# Patient Record
Sex: MALE | Race: BLACK OR AFRICAN AMERICAN | NOT HISPANIC OR LATINO | Employment: FULL TIME | URBAN - METROPOLITAN AREA
[De-identification: names, ages, dates, MRNs, and addresses within clinical notes are randomized per-mention and may not be internally consistent; named-entity substitution may affect disease eponyms.]

---

## 2018-04-06 ENCOUNTER — HOSPITAL ENCOUNTER (EMERGENCY)
Facility: HOSPITAL | Age: 53
Discharge: HOME | End: 2018-04-06
Payer: COMMERCIAL

## 2018-04-06 VITALS
BODY MASS INDEX: 30.37 KG/M2 | HEART RATE: 81 BPM | WEIGHT: 182.31 LBS | SYSTOLIC BLOOD PRESSURE: 156 MMHG | TEMPERATURE: 98 F | DIASTOLIC BLOOD PRESSURE: 87 MMHG | OXYGEN SATURATION: 98 % | RESPIRATION RATE: 18 BRPM | HEIGHT: 65 IN

## 2018-04-06 DIAGNOSIS — L02.419 ABSCESS OF FOREARM: Primary | ICD-10-CM

## 2018-04-06 DIAGNOSIS — M79.602 PAIN OF LEFT UPPER EXTREMITY: ICD-10-CM

## 2018-04-06 DIAGNOSIS — R03.0 ELEVATED BLOOD PRESSURE READING WITHOUT DIAGNOSIS OF HYPERTENSION: ICD-10-CM

## 2018-04-06 PROCEDURE — 10060 I&D ABSCESS SIMPLE/SINGLE: CPT | Mod: LT

## 2018-04-06 PROCEDURE — 25000003 PHARM REV CODE 250: Performed by: PHYSICIAN ASSISTANT

## 2018-04-06 PROCEDURE — 99283 EMERGENCY DEPT VISIT LOW MDM: CPT | Mod: 25

## 2018-04-06 RX ORDER — NAPROXEN 500 MG/1
500 TABLET ORAL 2 TIMES DAILY WITH MEALS
Qty: 12 TABLET | Refills: 0 | Status: SHIPPED | OUTPATIENT
Start: 2018-04-06

## 2018-04-06 RX ORDER — CLINDAMYCIN HYDROCHLORIDE 150 MG/1
300 CAPSULE ORAL
Status: COMPLETED | OUTPATIENT
Start: 2018-04-06 | End: 2018-04-06

## 2018-04-06 RX ORDER — NAPROXEN 500 MG/1
500 TABLET ORAL
Status: COMPLETED | OUTPATIENT
Start: 2018-04-06 | End: 2018-04-06

## 2018-04-06 RX ORDER — CLINDAMYCIN HYDROCHLORIDE 150 MG/1
300 CAPSULE ORAL EVERY 8 HOURS
Qty: 42 CAPSULE | Refills: 0 | Status: SHIPPED | OUTPATIENT
Start: 2018-04-06 | End: 2018-04-13

## 2018-04-06 RX ADMIN — LIDOCAINE HYDROCHLORIDE 10 ML: 10; .005 INJECTION, SOLUTION EPIDURAL; INFILTRATION; INTRACAUDAL; PERINEURAL at 09:04

## 2018-04-06 RX ADMIN — CLINDAMYCIN HYDROCHLORIDE 300 MG: 150 CAPSULE ORAL at 09:04

## 2018-04-06 RX ADMIN — NAPROXEN 500 MG: 500 TABLET ORAL at 09:04

## 2018-04-07 NOTE — ED PROVIDER NOTES
History      Chief Complaint   Patient presents with    Abscess     to L forearm; seen at clinic Monday, given antibiotic shot and keflex prescription; pt states redness, pain, and swelling has increased       Review of patient's allergies indicates:  No Known Allergies     HPI   HPI    4/6/2018, 9:19 PM   History obtained from the patient      History of Present Illness: Najma Garcia Jr. is a 52 y.o. male patient who presents to the Emergency Department for painful abscess to left forearm for 1 week.  Taking keflex with no improvement.  Denies f/n/v.  Symptoms are constant and moderate in severity.  No mitigating or exacerbating factors reported.   No further complaints or concerns at this time.           PCP: Primary Doctor No       Past Medical History:  History reviewed. No pertinent past medical history.      Past Surgical History:  History reviewed. No pertinent surgical history.        Family History:  History reviewed. No pertinent family history.        Social History:  Social History     Social History Main Topics    Smoking status: Never Smoker    Smokeless tobacco: Not on file    Alcohol use Not on file    Drug use: Unknown    Sexual activity: Not on file       ROS   Review of Systems   Constitutional: Negative for activity change and appetite change.   HENT: Negative for sore throat.    Respiratory: Negative for shortness of breath.    Cardiovascular: Negative for chest pain.   Gastrointestinal: Negative for nausea and vomiting.   Genitourinary: Negative for dysuria.   Musculoskeletal: Negative for back pain and neck pain.   Skin: Positive for abscess.  Negative for rash.   Neurological: Negative for weakness.   Hematological: Does not bruise/bleed easily.   All other systems reviewed and are negative.      Review of Systems    Physical Exam      Initial Vitals [04/06/18 2100]   BP Pulse Resp Temp SpO2   (!) 156/87 81 18 98.4 °F (36.9 °C) 98 %      MAP       110         Physical Exam  Vital  signs and nursing notes reviewed.  Constitutional: Patient is in NAD. Awake and alert. Well-developed and well-nourished.  Head: Atraumatic. Normocephalic.  Eyes: PERRL. EOM intact. Conjunctivae nl. No scleral icterus.  ENT: Mucous membranes are moist. Oropharynx is clear.  Neck: Supple. No JVD. No lymphadenopathy.  No meningismus  Cardiovascular: Regular rate and rhythm. No murmurs, rubs, or gallops. Distal pulses are 2+ and symmetric.  Pulmonary/Chest: No respiratory distress. Clear to auscultation bilaterally. No wheezing, rales, or rhonchi.  Abdominal: Soft. Non-distended. No TTP. No rebound, guarding, or rigidity. Good bowel sounds.  Genitourinary: No CVA tenderness  Musculoskeletal: Moves all extremities. No edema.   Skin: Warm and dry.   3 cm of erythema with central fluctuance to left forearm  Neurological: Awake and alert. No acute focal neurological deficits are appreciated.  Psychiatric: Normal affect. Good eye contact. Appropriate in content.      ED Course      I & D - Incision and Drainage  Date/Time: 4/6/2018 10:58 PM  Performed by: NEYDA HERNÁNDEZ.  Authorized by: NEYDA HERNÁNDEZ.   Consent Done: Yes  Consent: Verbal consent obtained.  Risks and benefits: risks, benefits and alternatives were discussed  Consent given by: patient  Patient understanding: patient states understanding of the procedure being performed  Patient consent: the patient's understanding of the procedure matches consent given  Procedure consent: procedure consent matches procedure scheduled  Type: abscess  Body area: upper extremity  Location details: left arm    Anesthesia:  Local Anesthetic: lidocaine 1% with epinephrine  Scalpel size: 11  Incision type: single straight  Complexity: simple  Drainage: pus  Drainage amount: moderate  Wound treatment: incision,  expression of material,  drainage,  wound left open,  deloculation and  wound packed  Packing material: 1/4 in gauze  Complications: No  Patient tolerance: Patient tolerated  "the procedure well with no immediate complications        ED Vital Signs:  Vitals:    04/06/18 2100   BP: (!) 156/87   Pulse: 81   Resp: 18   Temp: 98.4 °F (36.9 °C)   TempSrc: Oral   SpO2: 98%   Weight: 82.7 kg (182 lb 5.1 oz)   Height: 5' 5" (1.651 m)                 Imaging Results:  Imaging Results    None          Pre-hypertension/Hypertension: The pt has been informed that they may have pre-hypertension or hypertension based on a blood pressure reading in the ED. I recommend that the pt call the PCP listed on their discharge instructions or a physician of their choice this week to arrange f/u for further evaluation of possible pre-hypertension or hypertension.       All findings were reviewed with the patient/family in detail.   All remaining questions and concerns were addressed at that time.  Patient/family has been counseled regarding the need for follow-up as well as the indication to return to the emergency room should new or worrisome developments occur.      ED Discussion               Medication(s) given in the ER:  Medications   lidocaine-EPINEPHrine (PF) 1%-1:200,000 injection 10 mL (10 mLs Intradermal Given 4/6/18 2145)   clindamycin capsule 300 mg (300 mg Oral Given 4/6/18 2145)   naproxen tablet 500 mg (500 mg Oral Given 4/6/18 2145)           Follow-up Information     [facility] in 2 days.    Specialty:  Internal Medicine  Contact information:  [address] [facility] [address] [phone]  Additional information:  (off [facility]) 1st floor           [facility]    Specialty:  Emergency Medicine  Why:  If symptoms worsen  Contact information:  [address] [facility] [address] [phone]                     New Prescriptions    CLINDAMYCIN (CLEOCIN) 150 MG CAPSULE    Take 2 capsules (300 mg total) by mouth every 8 (eight) hours.    NAPROXEN (NAPROSYN) 500 MG TABLET    Take 1 tablet (500 mg total) by mouth 2 (two) " times daily with meals. Prn pain          Medical Decision Making        MDM               Clinical Impression:        ICD-10-CM ICD-9-CM   1. Abscess of forearm L02.419 682.3   2. Elevated blood pressure reading without diagnosis of hypertension R03.0 796.2   3. Pain of left upper extremity M79.602 729.5              Disposition  Stable  Discharged     Lenora Patricio PA-C  04/06/18 0471

## 2022-12-03 ENCOUNTER — HOSPITAL ENCOUNTER (EMERGENCY)
Facility: HOSPITAL | Age: 57
Discharge: HOME | End: 2022-12-03
Attending: EMERGENCY MEDICINE
Payer: COMMERCIAL

## 2022-12-03 VITALS
HEART RATE: 96 BPM | DIASTOLIC BLOOD PRESSURE: 71 MMHG | BODY MASS INDEX: 28.61 KG/M2 | RESPIRATION RATE: 20 BRPM | WEIGHT: 171.75 LBS | TEMPERATURE: 99 F | OXYGEN SATURATION: 95 % | HEIGHT: 65 IN | SYSTOLIC BLOOD PRESSURE: 142 MMHG

## 2022-12-03 DIAGNOSIS — S51.002A AVULSION OF SKIN OF LEFT ELBOW, INITIAL ENCOUNTER: Primary | ICD-10-CM

## 2022-12-03 DIAGNOSIS — Z23 TETANUS-DIPHTHERIA VACCINATION ADMINISTERED AT CURRENT VISIT: ICD-10-CM

## 2022-12-03 PROCEDURE — 63600175 PHARM REV CODE 636 W HCPCS: Performed by: NURSE PRACTITIONER

## 2022-12-03 PROCEDURE — 90715 TDAP VACCINE 7 YRS/> IM: CPT | Performed by: NURSE PRACTITIONER

## 2022-12-03 PROCEDURE — 90471 IMMUNIZATION ADMIN: CPT | Performed by: NURSE PRACTITIONER

## 2022-12-03 PROCEDURE — 99284 EMERGENCY DEPT VISIT MOD MDM: CPT | Mod: 25

## 2022-12-03 PROCEDURE — 25000003 PHARM REV CODE 250: Performed by: NURSE PRACTITIONER

## 2022-12-03 RX ORDER — TRAMADOL HYDROCHLORIDE 50 MG/1
50 TABLET ORAL
Status: COMPLETED | OUTPATIENT
Start: 2022-12-03 | End: 2022-12-03

## 2022-12-03 RX ORDER — MUPIROCIN 20 MG/G
OINTMENT TOPICAL 3 TIMES DAILY
Qty: 22 G | Refills: 0 | Status: SHIPPED | OUTPATIENT
Start: 2022-12-03 | End: 2022-12-08

## 2022-12-03 RX ADMIN — BACITRACIN ZINC, NEOMYCIN, POLYMYXIN B 1 EACH: 400; 3.5; 5 OINTMENT TOPICAL at 02:12

## 2022-12-03 RX ADMIN — TRAMADOL HYDROCHLORIDE 50 MG: 50 TABLET, COATED ORAL at 02:12

## 2022-12-03 RX ADMIN — TETANUS TOXOID, REDUCED DIPHTHERIA TOXOID AND ACELLULAR PERTUSSIS VACCINE, ADSORBED 0.5 ML: 5; 2.5; 8; 8; 2.5 SUSPENSION INTRAMUSCULAR at 02:12

## 2022-12-03 NOTE — ED PROVIDER NOTES
HISTORY     Chief Complaint   Patient presents with    Laceration     Pt has laceration/skin tear to L upper FA. Minor bleeding controlled. Pt punctured arm on screw approx 1 hr pta. Last Tdap?     Review of patient's allergies indicates:  No Known Allergies     HPI   The history is provided by the patient.   Laceration   The incident occurred 2 to 3 hours ago. The laceration is located on the Left arm. Size: 2cm diameter [place] avulsion. The pain is at a severity of 5/10. The pain has been Constant since onset. He reports no foreign bodies present. His tetanus status is unknown.      PCP: Primary Doctor No     Past Medical History:  No past medical history on file.     Past Surgical History:  No past surgical history on file.     Family History:  No family history on file.     Social History:  Social History     Tobacco Use    Smoking status: Never    Smokeless tobacco: Not on file   Substance and Sexual Activity    Alcohol use: Not on file    Drug use: Not on file    Sexual activity: Not on file         ROS   Review of Systems   Constitutional:  Negative for fever.   HENT:  Negative for sore throat.    Respiratory:  Negative for shortness of breath.    Cardiovascular:  Negative for chest pain.   Gastrointestinal:  Negative for nausea.   Genitourinary:  Negative for dysuria.   Musculoskeletal:  Negative for back pain.   Skin:  Positive for wound. Negative for rash.   Neurological:  Negative for weakness.   Hematological:  Does not bruise/bleed easily.     PHYSICAL EXAM     Initial Vitals [12/03/22 0145]   BP Pulse Resp Temp SpO2   (!) 142/71 96 16 98.5 °F (36.9 °C) 95 %      MAP       --           Physical Exam    Constitutional: He appears well-developed and well-nourished. No distress.   HENT:   Head: Normocephalic and atraumatic.   Eyes: Conjunctivae are normal. Pupils are equal, round, and reactive to light.   Neck: Neck supple.   Normal range of motion.  Cardiovascular:  Normal rate, regular rhythm and  "normal heart sounds.           Pulmonary/Chest: Breath sounds normal.   Abdominal: Abdomen is soft. Bowel sounds are normal.   Musculoskeletal:         General: Normal range of motion.      Cervical back: Normal range of motion and neck supple.     Neurological: He is alert and oriented to person, place, and time. No cranial nerve deficit.   Skin: Skin is warm and dry.        Psychiatric: He has a normal mood and affect.        ED COURSE   Procedures  ED ONGOING VITALS:  Vitals:    12/03/22 0145 12/03/22 0207   BP: (!) 142/71    Pulse: 96    Resp: 16 20   Temp: 98.5 °F (36.9 °C)    TempSrc: Oral    SpO2: 95%    Weight: 77.9 kg (171 lb 11.8 oz)    Height: 5' 5" (1.651 m)          ABNORMAL LAB VALUES:  Labs Reviewed - No data to display      ALL LAB VALUES:        RADIOLOGY STUDIES:  Imaging Results    None                   The above vital signs and test results have been reviewed by the emergency provider.     ED Medications:  Current Discharge Medication List        START taking these medications    Details   mupirocin (BACTROBAN) 2 % ointment Apply topically 3 (three) times daily. for 5 days  Qty: 22 g, Refills: 0           Discharge Medications:  New Prescriptions    MUPIROCIN (BACTROBAN) 2 % OINTMENT    Apply topically 3 (three) times daily. for 5 days       Follow-up Information       Schedule an appointment as soon as possible for a visit  with PCP.               [facility]    Specialty: Emergency Medicine  Contact information:  [id number] [place] [address] [phone]                          I discussed with patient and/or family/caretaker that evaluation in the ED does not suggest any emergent or life threatening medical conditions requiring immediate intervention beyond what was provided in the ED, and I believe patient is safe for discharge. Regardless, an unremarkable evaluation in the ED does not preclude the development or presence of a serious or life " threatening condition. As such, patient was instructed to return immediately for any worsening or change in current symptoms.    Pre-hypertension/Hypertension: The pt has been informed that they may have pre-hypertension or hypertension based on a blood pressure reading in the ED. I recommend that the pt call the PCP listed on their discharge instructions or a physician of their choice this week to arrange f/u for further evaluation of possible pre-hypertension or hypertension.       MEDICAL DECISION MAKING          Vaccine Counseling: Tetanus, diphtheria, and pertussis are very serious diseases. Tdap vaccine can protect us from these diseases.  And, Tdap vaccine given to pregnant women can protect  babies against pertussis.These diseases are caused by bacteria. Diphtheria and pertussis are spread from person to person through secretions from coughing or sneezing. Tetanus enters the body through cuts, scratches, or wounds. With any medicine, including vaccines, there is a chance of side effects. These are usually mild and go away on their own. Serious reactions are also possible but are rare. Return to ER or see PCP if any adverse reaction occurs immediately          CLINICAL IMPRESSION       ICD-10-CM ICD-9-CM   1. Avulsion of skin of left elbow, initial encounter  S51.002A 881.01   2. Tetanus-diphtheria vaccination administered at current visit  Z23 V49.89       Disposition:   Disposition: Discharged  Condition: Stable       Shadi Olea NP  22 0218